# Patient Record
Sex: FEMALE | Race: WHITE | NOT HISPANIC OR LATINO
[De-identification: names, ages, dates, MRNs, and addresses within clinical notes are randomized per-mention and may not be internally consistent; named-entity substitution may affect disease eponyms.]

---

## 2017-11-07 PROBLEM — Z00.00 ENCOUNTER FOR PREVENTIVE HEALTH EXAMINATION: Status: ACTIVE | Noted: 2017-11-07

## 2017-11-10 ENCOUNTER — APPOINTMENT (OUTPATIENT)
Dept: VASCULAR SURGERY | Facility: CLINIC | Age: 27
End: 2017-11-10
Payer: COMMERCIAL

## 2017-11-10 VITALS — BODY MASS INDEX: 26.36 KG/M2 | WEIGHT: 178 LBS | HEIGHT: 69 IN

## 2017-11-10 DIAGNOSIS — Z78.9 OTHER SPECIFIED HEALTH STATUS: ICD-10-CM

## 2017-11-10 DIAGNOSIS — Z80.9 FAMILY HISTORY OF MALIGNANT NEOPLASM, UNSPECIFIED: ICD-10-CM

## 2017-11-10 DIAGNOSIS — Z87.898 PERSONAL HISTORY OF OTHER SPECIFIED CONDITIONS: ICD-10-CM

## 2017-11-10 DIAGNOSIS — F17.200 NICOTINE DEPENDENCE, UNSPECIFIED, UNCOMPLICATED: ICD-10-CM

## 2017-11-10 DIAGNOSIS — I86.8 VARICOSE VEINS OF OTHER SPECIFIED SITES: ICD-10-CM

## 2017-11-10 PROCEDURE — 99203 OFFICE O/P NEW LOW 30 MIN: CPT

## 2017-11-10 PROCEDURE — 93970 EXTREMITY STUDY: CPT

## 2021-09-28 ENCOUNTER — NON-APPOINTMENT (OUTPATIENT)
Age: 31
End: 2021-09-28

## 2021-09-28 ENCOUNTER — LABORATORY RESULT (OUTPATIENT)
Age: 31
End: 2021-09-28

## 2021-09-28 ENCOUNTER — APPOINTMENT (OUTPATIENT)
Dept: GASTROENTEROLOGY | Facility: CLINIC | Age: 31
End: 2021-09-28
Payer: MEDICAID

## 2021-09-28 VITALS
DIASTOLIC BLOOD PRESSURE: 70 MMHG | WEIGHT: 176 LBS | SYSTOLIC BLOOD PRESSURE: 110 MMHG | HEIGHT: 70 IN | OXYGEN SATURATION: 98 % | BODY MASS INDEX: 25.2 KG/M2 | TEMPERATURE: 98.3 F | HEART RATE: 96 BPM

## 2021-09-28 DIAGNOSIS — E53.8 DEFICIENCY OF OTHER SPECIFIED B GROUP VITAMINS: ICD-10-CM

## 2021-09-28 DIAGNOSIS — E61.1 IRON DEFICIENCY: ICD-10-CM

## 2021-09-28 PROCEDURE — 99204 OFFICE O/P NEW MOD 45 MIN: CPT | Mod: 25

## 2021-09-28 NOTE — ASSESSMENT
[FreeTextEntry1] : Will check labs as listed.\par \par Will likely need an EGD and colonoscopy for further evaluation, however first wants to have labs drawn.\par \par Thank you for referring Ms. SWANSON.  Please do not hesitate to call to further discuss his/her care.\par \par Note faxed to requesting MD.\par \par

## 2021-09-28 NOTE — HISTORY OF PRESENT ILLNESS
[FreeTextEntry1] : Ms. Mueller is a pleasant 31F h/o gastric sleeve 2016 who is referred by Dr. Vidal for iron deficiency anemia, B12 deficiency.\par \par States she developed an E. coli infection causing diarrhea over the summer, was treated with abx, since that time her bowel movements have not returned to normal.  Still has frequent loose brown stool.  Urgency has improved.\par \par Had recent blood work 3-4 weeks ago showing low iron and B12 levels, was referred for these findings.\par \par No rectal bleeding or black stool. No abd pain.\par \par Otherwise feels well.\par \par Does not have heavy menses.\par \par Broad diet, no restrictions.\par \par Does not smoke or drink.\par \par No FHx of any GI malignancies.

## 2021-09-30 LAB
BASOPHILS # BLD AUTO: 0.04 K/UL
BASOPHILS NFR BLD AUTO: 0.6 %
EOSINOPHIL # BLD AUTO: 0.13 K/UL
EOSINOPHIL NFR BLD AUTO: 1.9 %
FERRITIN SERPL-MCNC: 575 NG/ML
FOLATE SERPL-MCNC: 2.6 NG/ML
HCT VFR BLD CALC: 23 %
HGB BLD-MCNC: 7.7 G/DL
IMM GRANULOCYTES NFR BLD AUTO: 0.1 %
IRON SATN MFR SERPL: 70 %
IRON SERPL-MCNC: 140 UG/DL
LYMPHOCYTES # BLD AUTO: 1.68 K/UL
LYMPHOCYTES NFR BLD AUTO: 24.6 %
MAN DIFF?: NORMAL
MCHC RBC-ENTMCNC: 33.5 GM/DL
MCHC RBC-ENTMCNC: 41.8 PG
MCV RBC AUTO: 125 FL
MONOCYTES # BLD AUTO: 0.49 K/UL
MONOCYTES NFR BLD AUTO: 7.2 %
NEUTROPHILS # BLD AUTO: 4.48 K/UL
NEUTROPHILS NFR BLD AUTO: 65.6 %
PLATELET # BLD AUTO: 118 K/UL
RBC # BLD: 1.84 M/UL
RBC # FLD: 21.9 %
TIBC SERPL-MCNC: 201 UG/DL
UIBC SERPL-MCNC: 61 UG/DL
VIT B12 SERPL-MCNC: 648 PG/ML
WBC # FLD AUTO: 6.83 K/UL

## 2022-08-03 ENCOUNTER — TRANSCRIPTION ENCOUNTER (OUTPATIENT)
Age: 32
End: 2022-08-03

## 2022-08-04 ENCOUNTER — RESULT REVIEW (OUTPATIENT)
Age: 32
End: 2022-08-04

## 2022-08-29 ENCOUNTER — LABORATORY RESULT (OUTPATIENT)
Age: 32
End: 2022-08-29

## 2022-08-29 ENCOUNTER — APPOINTMENT (OUTPATIENT)
Dept: GASTROENTEROLOGY | Facility: CLINIC | Age: 32
End: 2022-08-29

## 2022-08-29 VITALS
OXYGEN SATURATION: 99 % | BODY MASS INDEX: 24.34 KG/M2 | HEART RATE: 76 BPM | TEMPERATURE: 98.1 F | DIASTOLIC BLOOD PRESSURE: 70 MMHG | WEIGHT: 170 LBS | HEIGHT: 70 IN | SYSTOLIC BLOOD PRESSURE: 117 MMHG

## 2022-08-29 DIAGNOSIS — K74.60 UNSPECIFIED CIRRHOSIS OF LIVER: ICD-10-CM

## 2022-08-29 DIAGNOSIS — R18.8 UNSPECIFIED CIRRHOSIS OF LIVER: ICD-10-CM

## 2022-08-29 PROCEDURE — 36415 COLL VENOUS BLD VENIPUNCTURE: CPT

## 2022-08-29 PROCEDURE — 99214 OFFICE O/P EST MOD 30 MIN: CPT | Mod: 25

## 2022-08-29 NOTE — HISTORY OF PRESENT ILLNESS
[FreeTextEntry1] : 32f hx appy '14, UTIs, gastric sleeve '16, chronic anemia, hospitalized 8/2-6 at [facility] - fatigue, tremors, LE edema, decreased oral intake - hepatic decompensation. Hosp course as below.  She is down to 77 kg from 87kg on d/c from hosp 8/5/22. \par \par Feeling better everyday. LE edema improving - though still significant and bothersome.  No abd distension.  No pain. Energy back to normal.  No EtOH since 4d prior to hospitalization (though EtOH level positive in hospital ER).\par \par Prior testing: \par 2022 PMD Dr. Ordonez earlier in year w/ some mild LFT abnormalities and a high MCV - saw heme and started folate - \par 6/2022 ER visit 6/2022 - LE edema - TB 2.2 and AST//43 that visit, CT noncontrast w/ new steatosis an HM since '14 scan. LE edema\par 8/2/22 Hospitalized - persistent fatigue that progressed, prompting ER visit, with fleeting chest discomfort/SOB - CTA neg for PE; steatosis, HM and trace perisplenic ascites noted.  LFTs noted again similar to prior visit and US  showed gallstones, no db dil, mild perihepatic ascites, diffuse steatosis, HM. Tbili rising to 4.1 and DB 2.1, , ALT 34, ,  Mg 1.5, BUN/creat WNL.  HCT neg, TSH WNL.  Hb 9.4, , plats 73.  2014 LFTs were normal. Folate 2.5, B12 630.  CT head and LE dopplers unrevealing.\par -RE: EtOH use - states she has 1-2 drinks when out with friends.  States last drink was 4d PTAo.  When confronted w/ EtOH level whichh was 85 on admission, she states it is because she doesn't eat.  Chart reflects brother telling ER staff  that she drinks heavily.  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  \par -no APAP or new meds\par -my imp. was most likely EtOH liver disease/hepatitis - but actually w/ spider angiomata, possibly related to progression to cirrhosis.  This is unusual at this age from EtOH alone. Possibly OROURKE as well.  Possible iron overload component as well - high ferritin - exc no iron on MRI, neg HFE genes; ASMA 1:20\par -serologic w/u unrevealing as inpt 8/2022 exc high ferritin \par -it is noteworthy that she denies EtOH in 4d PTA but had +EtOH level in ER; hx of excess EtOH from brother.  If her liver deteriorates to a point where transplant is a consideration, she would need to prove more forthright on this front.\par -8/2022 MRI+: No evidence of biliary obstruction. Hepatomegaly with possible morphologic features suggestive of hepatic cirrhosis. Indeterminate segment 3 hepatic lesion. Suggest short-term interval repeat MRI with Eovist contrast to attempt to better characterize. Evidence of portal hypertension including small to moderate volume ascites and small varices, as above. Hepatic Fat Fraction: Moderate to severe steatosis. Hepatic Iron Deposition: None. Hepatic stiffness: 4-5 kPa: Stage 3 to 4 fibrosis*\par -8/2022 Ascites tap c/w portal HTN and no SBP\par -anemia is chronic and multifactorial, folate-def, EtOH, but not iron def.\par -INR, bili improving on d/c\par -8/2022 AFP normal\par \par Soc:  no tobacco or significant EtOH\par FHx: no FHx GI malignancy or IBD\par \par ROS:\par Constitutional:: no weight loss, fevers\par ENT: no deafness\par Eyes: not blind\par Neck: no LN\par Chest: no dyspnea/cough\par Cardiac: no chest pain\par Vascular: no leg swelling\par GI: no abdominal pain, nausea, vomiting, diarrhea, constipation, rectal bleeding, dysphagia, melena unless otherwise noted in HPI\par : no dysuria, dark urine\par Skin: no rashes, jaundice\par Heme: no bleeding\par Endocrine: no DM unless otherwise stated in HPI\par \par Px: (VS noted below)\par General: NAD; + spider angiomata torso\par Eyes: anicteric\par Oropharynx:  clear\par Neck: no LN\par Chest: normal respiratory effort\par CVS: regular\par Abd: soft, NT, ND, +BS, no HSM\par Ext: no atrophy; 2+ B/L pitting edema\par Neuro: grossly nonfocal, no asterixis\par \par Labs/imaging/prior endoscopic results reviewed to the extent available and noted in HPI\par

## 2022-08-29 NOTE — ASSESSMENT
[FreeTextEntry1] : -MRI w/ eovist \par -EGD to screen for varices - will await until after MRI, Dr. Luis mandujano, as she seems to be "recompensating"\par -advised pt to never drink EtOH again, likely fatal consequences.  \par -Has hepatology delbert scheduled. \par -folate repletion\par -Low Na diet.\par -labs - will see if we can start low dose diuretics

## 2022-09-01 LAB
ALBUMIN SERPL ELPH-MCNC: 3.3 G/DL
ALP BLD-CCNC: 105 U/L
ALT SERPL-CCNC: 13 U/L
ANION GAP SERPL CALC-SCNC: 7 MMOL/L
AST SERPL-CCNC: 30 U/L
BASOPHILS # BLD AUTO: 0.03 K/UL
BASOPHILS NFR BLD AUTO: 0.4 %
BILIRUB SERPL-MCNC: 0.6 MG/DL
BUN SERPL-MCNC: 6 MG/DL
CALCIUM SERPL-MCNC: 8.9 MG/DL
CHLORIDE SERPL-SCNC: 108 MMOL/L
CO2 SERPL-SCNC: 25 MMOL/L
CREAT SERPL-MCNC: 0.58 MG/DL
EGFR: 122 ML/MIN/1.73M2
EOSINOPHIL # BLD AUTO: 0.27 K/UL
EOSINOPHIL NFR BLD AUTO: 3.9 %
GLUCOSE SERPL-MCNC: 91 MG/DL
HCT VFR BLD CALC: 29.4 %
HGB BLD-MCNC: 9.4 G/DL
IMM GRANULOCYTES NFR BLD AUTO: 0.3 %
INR PPP: 1.01 RATIO
LYMPHOCYTES # BLD AUTO: 1.81 K/UL
LYMPHOCYTES NFR BLD AUTO: 26.2 %
MAN DIFF?: NORMAL
MCHC RBC-ENTMCNC: 32 GM/DL
MCHC RBC-ENTMCNC: 37 PG
MCV RBC AUTO: 115.7 FL
MONOCYTES # BLD AUTO: 0.54 K/UL
MONOCYTES NFR BLD AUTO: 7.8 %
NEUTROPHILS # BLD AUTO: 4.24 K/UL
NEUTROPHILS NFR BLD AUTO: 61.4 %
PLATELET # BLD AUTO: 188 K/UL
POTASSIUM SERPL-SCNC: 3.9 MMOL/L
PROT SERPL-MCNC: 6.1 G/DL
PT BLD: 11.8 SEC
RBC # BLD: 2.54 M/UL
RBC # FLD: 14.6 %
SODIUM SERPL-SCNC: 141 MMOL/L
WBC # FLD AUTO: 6.91 K/UL

## 2022-09-02 ENCOUNTER — NON-APPOINTMENT (OUTPATIENT)
Age: 32
End: 2022-09-02

## 2022-09-29 ENCOUNTER — APPOINTMENT (OUTPATIENT)
Dept: HEPATOLOGY | Facility: CLINIC | Age: 32
End: 2022-09-29

## 2022-10-04 RX ORDER — FOLIC ACID 5 MG
5 CAPSULE ORAL
Qty: 30 | Refills: 5 | Status: ACTIVE | COMMUNITY
Start: 2022-09-02 | End: 1900-01-01

## 2022-10-04 RX ORDER — SPIRONOLACTONE 50 MG/1
50 TABLET ORAL DAILY
Qty: 7 | Refills: 0 | Status: DISCONTINUED | COMMUNITY
Start: 2022-09-02 | End: 2022-10-04

## 2022-10-04 RX ORDER — FUROSEMIDE 20 MG/1
20 TABLET ORAL DAILY
Qty: 7 | Refills: 0 | Status: DISCONTINUED | COMMUNITY
Start: 2022-09-02 | End: 2022-10-04

## 2022-10-06 ENCOUNTER — NON-APPOINTMENT (OUTPATIENT)
Age: 32
End: 2022-10-06

## 2022-12-22 ENCOUNTER — APPOINTMENT (OUTPATIENT)
Dept: HEPATOLOGY | Facility: CLINIC | Age: 32
End: 2022-12-22